# Patient Record
Sex: FEMALE | URBAN - METROPOLITAN AREA
[De-identification: names, ages, dates, MRNs, and addresses within clinical notes are randomized per-mention and may not be internally consistent; named-entity substitution may affect disease eponyms.]

---

## 2021-04-29 ENCOUNTER — APPOINTMENT (RX ONLY)
Dept: URBAN - METROPOLITAN AREA CLINIC 114 | Facility: CLINIC | Age: 31
Setting detail: DERMATOLOGY
End: 2021-04-29

## 2021-04-29 DIAGNOSIS — L71.8 OTHER ROSACEA: ICD-10-CM

## 2021-04-29 PROBLEM — M79.3 PANNICULITIS, UNSPECIFIED: Status: ACTIVE | Noted: 2021-04-29

## 2021-04-29 PROCEDURE — ? PRESCRIPTION SAMPLES PROVIDED

## 2021-04-29 PROCEDURE — ? PRESCRIPTION

## 2021-04-29 PROCEDURE — ? COUNSELING

## 2021-04-29 PROCEDURE — ? PRESCRIPTION MEDICATION MANAGEMENT

## 2021-04-29 PROCEDURE — 99204 OFFICE O/P NEW MOD 45 MIN: CPT

## 2021-04-29 PROCEDURE — ? DIAGNOSIS COMMENT

## 2021-04-29 RX ORDER — CLOBETASOL PROPIONATE 0.5 MG/G
CREAM TOPICAL
Qty: 1 | Refills: 0 | Status: ERX | COMMUNITY
Start: 2021-04-29

## 2021-04-29 RX ADMIN — CLOBETASOL PROPIONATE: 0.5 CREAM TOPICAL at 00:00

## 2021-04-29 ASSESSMENT — LOCATION SIMPLE DESCRIPTION DERM
LOCATION SIMPLE: LEFT CHEEK
LOCATION SIMPLE: RIGHT CHEEK
LOCATION SIMPLE: SUPERIOR FOREHEAD

## 2021-04-29 ASSESSMENT — LOCATION ZONE DERM: LOCATION ZONE: FACE

## 2021-04-29 ASSESSMENT — SEVERITY ASSESSMENT OVERALL AMONG ALL PATIENTS
IN YOUR EXPERIENCE, AMONG ALL PATIENTS YOU HAVE SEEN WITH THIS CONDITION, HOW SEVERE IS THIS PATIENT'S CONDITION?: MODERATE

## 2021-04-29 ASSESSMENT — LOCATION DETAILED DESCRIPTION DERM
LOCATION DETAILED: LEFT CENTRAL MALAR CHEEK
LOCATION DETAILED: SUPERIOR MID FOREHEAD
LOCATION DETAILED: RIGHT INFERIOR CENTRAL MALAR CHEEK

## 2021-04-29 NOTE — PROCEDURE: DIAGNOSIS COMMENT
Render Risk Assessment In Note?: no
Comment: Biopsy by breast surgeon Dr. 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 Pt reports no blood work up was done yet, will get labs (including ANGELICA panel) through PCP.
Detail Level: Simple
Detail Level: Generalized
Comment: Pt s/p biopsy done by breast surgeon (no breast CA). Documentation was brought in and reviewed- bx proven lobular panniculitis. Work-up initiated for lupus (by rheumatologist- labs not completed yet). Discussed potential underlying causes - malignancy, auto-immune, infection, trauma. Will await labs, beginning treatment today for erythematous patches present on site with clobetasol cream.

## 2021-04-29 NOTE — PROCEDURE: PRESCRIPTION MEDICATION MANAGEMENT
Detail Level: Zone
Initiate Treatment: Clobetasol cream BID to breast
Render In Strict Bullet Format?: No

## 2021-04-29 NOTE — HPI: RASH
What Type Of Note Output Would You Prefer (Optional)?: Bullet Format
How Severe Is Your Rash?: mild
Is This A New Presentation, Or A Follow-Up?: Rash
Additional History: Previously biopsied by breast doctor on 04/08/2021, biopsy showed combined superficial/deep perivascular and periadnexal lymphocytic dermatitis with lobular panniculitis.

## 2021-06-08 ENCOUNTER — APPOINTMENT (RX ONLY)
Dept: URBAN - METROPOLITAN AREA CLINIC 114 | Facility: CLINIC | Age: 31
Setting detail: DERMATOLOGY
End: 2021-06-08

## 2021-06-08 DIAGNOSIS — Z79.899 OTHER LONG TERM (CURRENT) DRUG THERAPY: ICD-10-CM

## 2021-06-08 PROBLEM — M79.3 PANNICULITIS, UNSPECIFIED: Status: ACTIVE | Noted: 2021-06-08

## 2021-06-08 PROCEDURE — ? DIAGNOSIS COMMENT

## 2021-06-08 PROCEDURE — ? PHOTO-DOCUMENTATION

## 2021-06-08 PROCEDURE — ? LAB REPORTS REVIEWED

## 2021-06-08 PROCEDURE — 99214 OFFICE O/P EST MOD 30 MIN: CPT

## 2021-06-08 PROCEDURE — ? PRESCRIPTION

## 2021-06-08 PROCEDURE — ? PRESCRIPTION MEDICATION MANAGEMENT

## 2021-06-08 RX ORDER — TACROLIMUS 1 MG/G
OINTMENT TOPICAL
Qty: 1 | Refills: 1 | Status: ERX | COMMUNITY
Start: 2021-06-08

## 2021-06-08 RX ADMIN — TACROLIMUS: 1 OINTMENT TOPICAL at 00:00

## 2021-06-08 NOTE — PROCEDURE: PRESCRIPTION MEDICATION MANAGEMENT
Plan: Could consider re-biopsy if no improvement with Tacrolimus BID with occlusion x 1 month. Patient to provide copy of lab results ordered by PCP at follow up.
Detail Level: Zone
Initiate Treatment: Tacrolimus 0.1% Ointment with bandage occlusion BID
Discontinue Regimen: Clobetasol cream BID to breast
Render In Strict Bullet Format?: No

## 2021-06-08 NOTE — PROCEDURE: LAB REPORTS REVIEWED
Detail Level: Generalized
Summarized Lab Results: ANGELICA: neg, C-Reactive Protein: wnl; CBC: wnl; CMP: wnl, ALK/Phos: slightly elevated (drawn by PCP)

## 2021-06-08 NOTE — PROCEDURE: DIAGNOSIS COMMENT
Render Risk Assessment In Note?: no
Comment: Biopsy by breast surgeon Dr. 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 Pt reports no blood work up was done yet, will get labs (including ANGELICA panel) through PCP.
Detail Level: Simple